# Patient Record
Sex: MALE | URBAN - METROPOLITAN AREA
[De-identification: names, ages, dates, MRNs, and addresses within clinical notes are randomized per-mention and may not be internally consistent; named-entity substitution may affect disease eponyms.]

---

## 2023-04-27 ENCOUNTER — OFFICE VISIT (OUTPATIENT)
Dept: URBAN - METROPOLITAN AREA CLINIC 40 | Facility: CLINIC | Age: 28
End: 2023-04-27
Payer: MEDICARE

## 2023-04-27 DIAGNOSIS — B02.31 ZOSTER CONJUNCTIVITIS: Primary | ICD-10-CM

## 2023-04-27 PROCEDURE — 99203 OFFICE O/P NEW LOW 30 MIN: CPT | Performed by: OPTOMETRIST

## 2023-04-27 NOTE — IMPRESSION/PLAN
Impression: Zoster conjunctivitis: B02.31. Plan: Discussed diagnosis in detail with patient. Will continue to observe condition and or symptoms. Continue using current medication(s). Patient instructed to apply cold compresses.

## 2023-05-01 ENCOUNTER — OFFICE VISIT (OUTPATIENT)
Dept: URBAN - METROPOLITAN AREA CLINIC 40 | Facility: CLINIC | Age: 28
End: 2023-05-01
Payer: MEDICARE

## 2023-05-01 DIAGNOSIS — B02.31 ZOSTER CONJUNCTIVITIS: Primary | ICD-10-CM

## 2023-05-01 PROCEDURE — 99213 OFFICE O/P EST LOW 20 MIN: CPT | Performed by: OPTOMETRIST

## 2023-05-01 NOTE — IMPRESSION/PLAN
Impression: Zoster conjunctivitis: B02.31. Plan: Discussed diagnosis in detail with patient. Will continue to observe condition and or symptoms. Continue using current medication(s).